# Patient Record
Sex: MALE | Race: ASIAN | NOT HISPANIC OR LATINO | Employment: UNEMPLOYED | URBAN - METROPOLITAN AREA
[De-identification: names, ages, dates, MRNs, and addresses within clinical notes are randomized per-mention and may not be internally consistent; named-entity substitution may affect disease eponyms.]

---

## 2017-09-23 ENCOUNTER — HOSPITAL ENCOUNTER (EMERGENCY)
Facility: HOSPITAL | Age: 77
Discharge: HOME | End: 2017-09-23
Attending: FAMILY MEDICINE

## 2017-09-23 VITALS
BODY MASS INDEX: 16.83 KG/M2 | RESPIRATION RATE: 16 BRPM | WEIGHT: 101 LBS | HEART RATE: 79 BPM | HEIGHT: 65 IN | OXYGEN SATURATION: 98 % | DIASTOLIC BLOOD PRESSURE: 69 MMHG | SYSTOLIC BLOOD PRESSURE: 129 MMHG | TEMPERATURE: 98 F

## 2017-09-23 DIAGNOSIS — R04.0 EPISTAXIS: Primary | ICD-10-CM

## 2017-09-23 LAB
ANION GAP SERPL CALC-SCNC: 7 MMOL/L
BASOPHILS # BLD AUTO: 0.09 K/UL
BASOPHILS NFR BLD: 1.5 %
BUN SERPL-MCNC: 27 MG/DL
CALCIUM SERPL-MCNC: 9.4 MG/DL
CHLORIDE SERPL-SCNC: 107 MMOL/L
CO2 SERPL-SCNC: 25 MMOL/L
CREAT SERPL-MCNC: 0.9 MG/DL
DIFFERENTIAL METHOD: ABNORMAL
EOSINOPHIL # BLD AUTO: 0.5 K/UL
EOSINOPHIL NFR BLD: 7.7 %
ERYTHROCYTE [DISTWIDTH] IN BLOOD BY AUTOMATED COUNT: 13.7 %
EST. GFR  (AFRICAN AMERICAN): >60 ML/MIN/1.73 M^2
EST. GFR  (NON AFRICAN AMERICAN): >60 ML/MIN/1.73 M^2
GLUCOSE SERPL-MCNC: 110 MG/DL
HCT VFR BLD AUTO: 38 %
HGB BLD-MCNC: 12.8 G/DL
LYMPHOCYTES # BLD AUTO: 2.2 K/UL
LYMPHOCYTES NFR BLD: 36.7 %
MCH RBC QN AUTO: 28.6 PG
MCHC RBC AUTO-ENTMCNC: 33.7 G/DL
MCV RBC AUTO: 85 FL
MONOCYTES # BLD AUTO: 0.5 K/UL
MONOCYTES NFR BLD: 7.9 %
NEUTROPHILS # BLD AUTO: 2.8 K/UL
NEUTROPHILS NFR BLD: 46 %
PLATELET # BLD AUTO: 248 K/UL
PMV BLD AUTO: 9.2 FL
POTASSIUM SERPL-SCNC: 4.5 MMOL/L
RBC # BLD AUTO: 4.47 M/UL
SODIUM SERPL-SCNC: 139 MMOL/L
WBC # BLD AUTO: 6.07 K/UL

## 2017-09-23 PROCEDURE — 99284 EMERGENCY DEPT VISIT MOD MDM: CPT | Mod: ,,, | Performed by: PHYSICIAN ASSISTANT

## 2017-09-23 PROCEDURE — 30901 CONTROL OF NOSEBLEED: CPT | Mod: RT

## 2017-09-23 PROCEDURE — 80048 BASIC METABOLIC PNL TOTAL CA: CPT

## 2017-09-23 PROCEDURE — 85025 COMPLETE CBC W/AUTO DIFF WBC: CPT

## 2017-09-23 PROCEDURE — 99283 EMERGENCY DEPT VISIT LOW MDM: CPT | Mod: 25

## 2017-09-23 PROCEDURE — 25000003 PHARM REV CODE 250: Performed by: PHYSICIAN ASSISTANT

## 2017-09-23 RX ORDER — LORATADINE 10 MG/1
10 TABLET ORAL DAILY
COMMUNITY

## 2017-09-23 RX ORDER — SILVER NITRATE 38.21; 12.74 MG/1; MG/1
1 STICK TOPICAL
Status: COMPLETED | OUTPATIENT
Start: 2017-09-23 | End: 2017-09-23

## 2017-09-23 RX ADMIN — SILVER NITRATE 1 APPLICATOR: 38.21; 12.74 STICK TOPICAL at 07:09

## 2017-09-23 NOTE — ED PROVIDER NOTES
Encounter Date: 9/23/2017       History     Chief Complaint   Patient presents with    Epistaxis     Seen at [facility] for the same Tuesday.      76 yo M presents to the ED with epistaxis.  Pt's daughter provides hx. She is a pediatrician. Pt has had intermittent nosebleeds for the past couple of weeks. He was seen at [facility] ER 4 days ago where his nosebleed required cauterization with silver nitrate. Daughter reports that this had resolved the bleeding until today. She reports R-sided unilateral epistaxis. She has attempted treatment for the patient with Afrin soaked gauze. Pt reports some           Review of patient's allergies indicates:  No Known Allergies  History reviewed. No pertinent past medical history.  History reviewed. No pertinent surgical history.  Family History   Problem Relation Age of Onset    No Known Problems Mother     No Known Problems Father      Social History   Substance Use Topics    Smoking status: Never Smoker    Smokeless tobacco: Never Used    Alcohol use No     Review of Systems   Constitutional: Negative for fever.   HENT: Positive for nosebleeds. Negative for sore throat.    Respiratory: Negative for shortness of breath.    Cardiovascular: Negative for chest pain.   Gastrointestinal: Negative for nausea.   Genitourinary: Negative for dysuria.   Musculoskeletal: Negative for back pain.   Skin: Negative for rash.   Neurological: Positive for light-headedness. Negative for weakness.   Hematological: Does not bruise/bleed easily.       Physical Exam     Initial Vitals [09/23/17 1731]   BP Pulse Resp Temp SpO2   129/69 79 16 97.8 °F (36.6 °C) 98 %      MAP       89         Physical Exam    Nursing note and vitals reviewed.  Constitutional: He appears well-developed and well-nourished.  Non-toxic appearance. He does not appear ill. No distress.   HENT:   Head: Normocephalic and atraumatic.   No active epistaxis after removal of gauze. Cauterized nasal mucosa noted.    Neck:  Normal range of motion. Neck supple.   Cardiovascular: Normal rate and regular rhythm. Exam reveals no gallop, no distant heart sounds and no friction rub.    No murmur heard.  Pulmonary/Chest: Effort normal and breath sounds normal. No accessory muscle usage. No tachypnea. No respiratory distress. He has no decreased breath sounds. He has no wheezes. He has no rhonchi. He has no rales.   Abdominal: Normal appearance. He exhibits no distension.   Musculoskeletal: Normal range of motion.   Neurological: He is alert and oriented to person, place, and time.   Skin: Skin is warm and dry. No rash noted. No pallor.   Psychiatric: He has a normal mood and affect. His behavior is normal. Judgment and thought content normal.         ED Course   Procedures  Labs Reviewed   CBC W/ AUTO DIFFERENTIAL - Abnormal; Notable for the following:        Result Value    RBC 4.47 (*)     Hemoglobin 12.8 (*)     Hematocrit 38.0 (*)     All other components within normal limits   BASIC METABOLIC PANEL - Abnormal; Notable for the following:     BUN, Bld 27 (*)     Anion Gap 7 (*)     All other components within normal limits             Medical Decision Making:   History:   Old Medical Records: I decided to obtain old medical records.  Differential Diagnosis:   My differential diagnosis includes but is not limited to: Anterior epistaxis, posterior epistaxis, anemia  Clinical Tests:   Lab Tests: Ordered and Reviewed       APC / Resident Notes:   76 yo M presents with epistaxis. Vitals are WNL. NAD. Gauze was removed and Afrin applied.  Patient was monitored for over 1 hour without any bleeding. A small area was cauterized.     Lab work unremarkable.  I will d/c in stable condition with ENT follow-up if bleeding recurs.  Return precautions given.  I have reviewed the patient's records and discussed this case with my supervising physician.                ED Course      Clinical Impression:   The encounter diagnosis was  Epistaxis.    Disposition:   Disposition: Discharged  Condition: Stable                          Taryn Gamble PA-C  09/23/17 5940

## 2017-09-23 NOTE — ED TRIAGE NOTES
Nose bleed right nare that started today at 1700pm.  Presents to ER with Afrin soaked packing.  The patient has been experiencing nose bleeding off and on for 2 weeks.    GENERAL: The patient is a frail elderly male in no apparent distress. Alert and oriented x4.                                                HEENT: Head is normocephalic and atraumatic. Extraocular muscles are intact. Pupils are equal, round, and reactive to light and accommodation. Nares appeared normal. Mouth is well hydrated and without lesions. Mucous membranes are moist. Posterior pharynx clear of any exudate or lesions.    NECK: Supple. No carotid bruits. No lymphadenopathy or thyromegaly.    LUNGS: Clear to auscultation.    HEART: Regular rate and rhythm without murmur.     ABDOMEN: Soft, nontender, and nondistended. Positive bowel sounds. No hepatosplenomegaly was noted.     EXTREMITIES: Without any cyanosis, clubbing, rash, lesions or edema.     NEUROLOGIC: Cranial nerves II through XII are grossly intact.     PSYCHIATRIC: Flat affect, but denies suicidal or homicidal ideations.    SKIN: No ulceration or induration present.

## 2017-09-26 ENCOUNTER — TELEPHONE (OUTPATIENT)
Dept: OTOLARYNGOLOGY | Facility: CLINIC | Age: 77
End: 2017-09-26

## 2017-09-26 NOTE — TELEPHONE ENCOUNTER
----- Message from Viridiana Puckett sent at 9/25/2017 10:08 AM CDT -----  Contact: Dr. Pruitt at [phone]  Carrie/Ambrose-Pt above was seen in the ER last night for a nosebleed.  Continues to bleed.  Dr. Pruitt, pts relative wants him seen in ENT here today.  Pt has no insurance and is a New Patient. Dr. Pruitt is aware of the $500.00 deposit.  Please call at above number.  Tried to reach everyone.

## 2017-12-06 ENCOUNTER — OFFICE VISIT (OUTPATIENT)
Dept: INTERNAL MEDICINE | Facility: CLINIC | Age: 77
End: 2017-12-06

## 2017-12-06 ENCOUNTER — IMMUNIZATION (OUTPATIENT)
Dept: INTERNAL MEDICINE | Facility: CLINIC | Age: 77
End: 2017-12-06

## 2017-12-06 VITALS
HEART RATE: 65 BPM | WEIGHT: 102.5 LBS | SYSTOLIC BLOOD PRESSURE: 120 MMHG | DIASTOLIC BLOOD PRESSURE: 63 MMHG | HEIGHT: 64 IN | BODY MASS INDEX: 17.5 KG/M2

## 2017-12-06 DIAGNOSIS — H40.9 GLAUCOMA, UNSPECIFIED GLAUCOMA TYPE, UNSPECIFIED LATERALITY: ICD-10-CM

## 2017-12-06 DIAGNOSIS — R04.0 EPISTAXIS: ICD-10-CM

## 2017-12-06 DIAGNOSIS — Z00.00 ANNUAL PHYSICAL EXAM: Primary | ICD-10-CM

## 2017-12-06 PROCEDURE — 90662 IIV NO PRSV INCREASED AG IM: CPT | Mod: PBBFAC

## 2017-12-06 PROCEDURE — 99204 OFFICE O/P NEW MOD 45 MIN: CPT | Mod: S$PBB,,, | Performed by: INTERNAL MEDICINE

## 2017-12-06 PROCEDURE — 99213 OFFICE O/P EST LOW 20 MIN: CPT | Mod: PBBFAC,25 | Performed by: INTERNAL MEDICINE

## 2017-12-06 PROCEDURE — 90732 PPSV23 VACC 2 YRS+ SUBQ/IM: CPT | Mod: PBBFAC

## 2017-12-06 PROCEDURE — 99999 PR PBB SHADOW E&M-EST. PATIENT-LVL III: CPT | Mod: PBBFAC,,, | Performed by: INTERNAL MEDICINE

## 2017-12-07 NOTE — PROGRESS NOTES
Subjective:       Patient ID: Dieudonne Lopez is a 77 y.o. male.    Chief Complaint: Establish Care    HPI     Mr. Lopez is a 78 yo male with history of epistaxis and chronic allergic cough who presents to clinic to establish care. He has recently moved from [place]. He is accompanied by his wife and daughter who is a pediatrician. He is feeling well today with no complaints.     He has had recent nose bleeds this year and was followed by ENT in [place]. Last nose bleed was here in September. He also had chronic cough which is due to allergies and post nasal drip. He is not taking anti-histamines because of the nosebleeds. His daughter states he had a CT of his chest after CXR revealed concerning lesion earlier this year in [place]. CT revealed benign appears granulomas and no further testing was done.     Cholesterol: (q5yr>21yo)  Vaccines: Influenza (yearly); Tetanus (every 10 yrs - 1st tdap); Pneumovax (>66yo); Prevnar; Zoster (>61yo)  Eye exam: History of glaucoma. Refer to ophthal today   Colonoscopy: Last C-scope at facility in [place] revealed on polyp that was benign.   A1c: check today       No past medical history on file.  No past surgical history on file.  Social History     Social History    Marital status:      Spouse name: N/A    Number of children: N/A    Years of education: N/A     Occupational History    Not on file.     Social History Main Topics    Smoking status: Never Smoker    Smokeless tobacco: Never Used    Alcohol use No    Drug use: No    Sexual activity: No     Other Topics Concern    Not on file     Social History Narrative    No narrative on file     Review of patient's allergies indicates:  No Known Allergies  Mr. Lopez had no medications administered during this visit.    Review of Systems   Constitutional: Negative for chills, diaphoresis, fatigue and fever.   HENT: Positive for congestion, nosebleeds, postnasal drip and rhinorrhea.    Eyes: Negative for  photophobia and visual disturbance.   Respiratory: Positive for cough. Negative for shortness of breath, wheezing and stridor.    Cardiovascular: Negative for chest pain, palpitations and leg swelling.   Gastrointestinal: Negative for abdominal pain, diarrhea, nausea and vomiting.   Endocrine: Negative for polydipsia and polyuria.   Genitourinary: Negative for dysuria, hematuria and urgency.   Musculoskeletal: Negative for arthralgias, gait problem and myalgias.   Skin: Negative for pallor and rash.   Allergic/Immunologic: Negative for immunocompromised state.   Neurological: Negative for dizziness, facial asymmetry, weakness, light-headedness and headaches.   Hematological: Negative for adenopathy.   Psychiatric/Behavioral: Negative for agitation and behavioral problems.       Objective:      Physical Exam   Constitutional: He is oriented to person, place, and time. He appears well-developed and well-nourished.   HENT:   Head: Normocephalic and atraumatic.   Eyes: EOM are normal. Pupils are equal, round, and reactive to light.   Neck: Normal range of motion. No JVD present.   Cardiovascular: Normal rate and regular rhythm.    Murmur heard.  Pulmonary/Chest: Effort normal and breath sounds normal. No respiratory distress.   Abdominal: Soft. Bowel sounds are normal. He exhibits no distension.   Musculoskeletal: Normal range of motion. He exhibits no edema.   Neurological: He is alert and oriented to person, place, and time. No cranial nerve deficit. Coordination normal.   Skin: Skin is warm and dry.   Psychiatric: He has a normal mood and affect. His behavior is normal.       Assessment:       1. Annual physical exam    2. Epistaxis    3. Glaucoma, unspecified glaucoma type, unspecified laterality        Plan:       1. Will order basic labs today. Will give flu shot and pneumovax today.   2. Refer to ENT.   3. Refer to ophthalmology.     Discussed with Dr. Haider.     Princess Perez MD PGY-3

## 2018-01-09 ENCOUNTER — OFFICE VISIT (OUTPATIENT)
Dept: OTOLARYNGOLOGY | Facility: CLINIC | Age: 78
End: 2018-01-09

## 2018-01-09 VITALS
DIASTOLIC BLOOD PRESSURE: 65 MMHG | SYSTOLIC BLOOD PRESSURE: 124 MMHG | HEART RATE: 66 BPM | BODY MASS INDEX: 18.05 KG/M2 | WEIGHT: 105.19 LBS

## 2018-01-09 DIAGNOSIS — R04.0 EPISTAXIS: Primary | ICD-10-CM

## 2018-01-09 PROCEDURE — 99999 PR PBB SHADOW E&M-EST. PATIENT-LVL II: CPT | Mod: PBBFAC,,, | Performed by: OTOLARYNGOLOGY

## 2018-01-09 PROCEDURE — 99212 OFFICE O/P EST SF 10 MIN: CPT | Mod: PBBFAC | Performed by: OTOLARYNGOLOGY

## 2018-01-09 PROCEDURE — 99203 OFFICE O/P NEW LOW 30 MIN: CPT | Mod: S$PBB,,, | Performed by: OTOLARYNGOLOGY

## 2018-01-09 NOTE — LETTER
January 9, 2018      [facility]  [address]  [facility] [address]           [facility]  [address]  [facility] [address]  Phone: [phone]  Fax: [phone]          Patient: Dieudonne Lopez   MR Number: [id number]   YOB: 1940   Date of Visit: 1/9/2018       Dear Dr. Princess Perez:    Thank you for referring Dieudonne Lopez to me for evaluation. Attached you will find relevant portions of my assessment and plan of care.    If you have questions, please do not hesitate to call me. I look forward to following Dieudonne Lopez along with you.    Sincerely,    Mesfin Kahn MD    Enclosure  CC:  No Recipients    If you would like to receive this communication electronically, please contact [email or web address] or [phone] to request more information on [employer] Link access.    For providers and/or their staff who would like to refer a patient to [facility], please contact us through our one-stop-shop provider referral line, [facility], at [phone].    If you feel you have received this communication in error or would no longer like to receive these types of communications, please e-mail [email or web address]

## 2018-01-09 NOTE — PROGRESS NOTES
Chief Complaint   Patient presents with    Epistaxis       HPI   77 y.o. male presents with an extensive history of epistaxis.  In the past, he required nasal cautery.  No recent nosebleeds.  Last episode was in 9/17.  No history of bleeding disorders or use of anticoagulation.     Review of Systems   Constitutional: Negative for fatigue and unexpected weight change.   HENT: Per HPI.  Eyes: Negative for visual disturbance.   Respiratory: Negative for shortness of breath, hemoptysis   Cardiovascular: Negative for chest pain and palpitations.   Musculoskeletal: Negative for decreased ROM, back pain.   Skin: Negative for rash, sunburn, itching.   Neurological: Negative for dizziness and seizures.   Hematological: Negative for adenopathy. Does not bruise/bleed easily.   Endocrine: Negative for rapid weight loss/weight gain, heat/cold intolerance.     Past Medical History   There is no problem list on file for this patient.          Past Surgical History   No past surgical history on file.      Family History   Family History   Problem Relation Age of Onset    No Known Problems Mother     No Known Problems Father            Social History   .  Social History     Social History    Marital status:      Spouse name: N/A    Number of children: N/A    Years of education: N/A     Occupational History    Not on file.     Social History Main Topics    Smoking status: Never Smoker    Smokeless tobacco: Never Used    Alcohol use No    Drug use: No    Sexual activity: No     Other Topics Concern    Not on file     Social History Narrative    No narrative on file         Allergies   Review of patient's allergies indicates:  No Known Allergies        Physical Exam     Vitals:    01/09/18 1622   BP: 124/65   Pulse: 66         Body mass index is 18.05 kg/m².      General: AOx3, NAD   Respiratory:  Symmetric chest rise, normal effort  Right Ear: External Auditory Canal WNL,TM w/o masses/lesions/perforations.  Middle  ear without evidence of effusion.   Left Ear: External Auditory Canal WNL,TM w/o masses/lesions/perforations.  Middle ear without evidence of effusion.   Nose: L nasal septal deviation. Inferior Turbinates WNL bilaterally. No septal perforation. No masses/lesions.   Oral Cavity:  Oral Tongue mobile, no lesions noted. Hard Palate WNL. No buccal or FOM lesions.  Oropharynx:  No masses/lesions of the posterior pharyngeal wall. Tonsillar fossa without lesions. Soft palate without masses. Midline uvula.   Neck: No scars.  No cervical lymphadenopathy, thyromegaly or thyroid nodules.  Normal range of motion.    Face: House Brackmann I bilaterally.     Assessment/Plan  Problem List Items Addressed This Visit     None      Visit Diagnoses     Epistaxis    -  Primary    Well-controlled.  Moisturize nose with saline spray.  RTC PRN.

## 2018-01-23 ENCOUNTER — OFFICE VISIT (OUTPATIENT)
Dept: OPTOMETRY | Facility: CLINIC | Age: 78
End: 2018-01-23

## 2018-01-23 DIAGNOSIS — H52.4 MYOPIA WITH PRESBYOPIA OF BOTH EYES: ICD-10-CM

## 2018-01-23 DIAGNOSIS — H04.129 DRY EYE SYNDROME: ICD-10-CM

## 2018-01-23 DIAGNOSIS — H25.13 NUCLEAR SCLEROSIS OF BOTH EYES: Primary | ICD-10-CM

## 2018-01-23 DIAGNOSIS — H52.13 MYOPIA WITH PRESBYOPIA OF BOTH EYES: ICD-10-CM

## 2018-01-23 PROCEDURE — 99212 OFFICE O/P EST SF 10 MIN: CPT | Mod: PBBFAC | Performed by: OPTOMETRIST

## 2018-01-23 PROCEDURE — 99999 PR PBB SHADOW E&M-EST. PATIENT-LVL II: CPT | Mod: PBBFAC,,, | Performed by: OPTOMETRIST

## 2018-01-23 PROCEDURE — 92004 COMPRE OPH EXAM NEW PT 1/>: CPT | Mod: S$PBB,,, | Performed by: OPTOMETRIST

## 2018-01-23 RX ORDER — BENZONATATE 100 MG/1
100 CAPSULE ORAL 3 TIMES DAILY PRN
COMMUNITY

## 2018-01-23 NOTE — PROGRESS NOTES
HPI     Last eye exam was approximately 1 year ago.  Patient states no vision changes since last exam but sometimes vision gets   blurry.  Patient denies diplopia, headaches, flashes/floaters, and pain.      Last edited by Tiffanie Yuan on 1/23/2018  3:35 PM. (History)            Assessment /Plan     For exam results, see Encounter Report.    Nuclear sclerosis of both eyes    Dry eye syndrome    Myopia with presbyopia of both eyes            1.  Educated on cataracts and affects on vision.  Patient happy with vision.  Monitor.  2.  Recommend artificial tears 2-3x/day OU.    3.  Continue w/ current rx                RTC 1 year for routine exam.

## 2018-01-23 NOTE — LETTER
January 23, 2018      [facility]  [address]  [facility] [address]           [facility]  [address]  [facility] [address]  Phone: [phone]  Fax: [phone]          Patient: Dieudonne Lopez   MR Number: [id number]   YOB: 1940   Date of Visit: 1/23/2018       Dear Dr. Princess Perez:    Thank you for referring Dieudonne Lopez to me for evaluation. Attached you will find relevant portions of my assessment and plan of care.    If you have questions, please do not hesitate to call me. I look forward to following Dieudonne Lopez along with you.    Sincerely,    Jodie Schuster, OD    Enclosure  CC:  No Recipients    If you would like to receive this communication electronically, please contact [email or web address] or [phone] to request more information on [employer] Link access.    For providers and/or their staff who would like to refer a patient to [facility], please contact us through our one-stop-shop provider referral line, [facility], at [phone].    If you feel you have received this communication in error or would no longer like to receive these types of communications, please e-mail [email or web address]

## 2018-11-13 ENCOUNTER — OFFICE VISIT (OUTPATIENT)
Dept: INTERNAL MEDICINE | Facility: CLINIC | Age: 78
End: 2018-11-13

## 2018-11-13 ENCOUNTER — IMMUNIZATION (OUTPATIENT)
Dept: INTERNAL MEDICINE | Facility: CLINIC | Age: 78
End: 2018-11-13

## 2018-11-13 VITALS
OXYGEN SATURATION: 98 % | HEIGHT: 65 IN | SYSTOLIC BLOOD PRESSURE: 118 MMHG | BODY MASS INDEX: 16.42 KG/M2 | HEART RATE: 55 BPM | DIASTOLIC BLOOD PRESSURE: 70 MMHG | WEIGHT: 98.56 LBS

## 2018-11-13 DIAGNOSIS — Z00.00 ANNUAL PHYSICAL EXAM: Primary | ICD-10-CM

## 2018-11-13 DIAGNOSIS — M25.511 RIGHT SHOULDER PAIN, UNSPECIFIED CHRONICITY: ICD-10-CM

## 2018-11-13 PROCEDURE — 99397 PER PM REEVAL EST PAT 65+ YR: CPT | Mod: S$PBB,,, | Performed by: PHYSICIAN ASSISTANT

## 2018-11-13 PROCEDURE — 99999 PR PBB SHADOW E&M-EST. PATIENT-LVL III: CPT | Mod: PBBFAC,,, | Performed by: PHYSICIAN ASSISTANT

## 2018-11-13 PROCEDURE — 90662 IIV NO PRSV INCREASED AG IM: CPT | Mod: PBBFAC

## 2018-11-13 PROCEDURE — 99213 OFFICE O/P EST LOW 20 MIN: CPT | Mod: PBBFAC,25 | Performed by: PHYSICIAN ASSISTANT

## 2018-11-13 NOTE — PATIENT INSTRUCTIONS
Understanding Glenohumeral Osteoarthritis    A joint is a place where two bones meet. The glenohumeral joint is the main joint in the shoulder. It is a ball-and-socket joint. It is formed where the head or ball of the upper arm bone fits into the shallow socket on the shoulder blade. The upper arm bone is called the humerus. The socket is called the glenoid. This is how the joint gets its name. When the glenohumeral joint is healthy, it helps you rotate and move your arm freely without any pain.  With glenohumeral osteoarthritis, the parts of the joint wear down. This can cause pain, stiffness, and limited movement. Glenohumeral osteoarthritis is a long-term condition. But treatment can help manage symptoms, increase movement, and improve function.  How glenohumeral osteoarthritis occurs  All joints contain a smooth tissue called cartilage. Cartilage cushions the ends of bones. This helps them glide smoothly against each other. Glenohumeral osteoarthritis occurs when cartilage in the glenohumeral joint begins to break down. The ball and socket bones may then become exposed and rub together. The bones may become rough and pitted and may begin to wear away. This prevents smooth movement of the joint.  Causes of glenohumeral osteoarthritis  In most cases, the condition develops because of damage from a shoulder injury, such as a dislocated or broken shoulder. Normal wear and tear of the joint from aging can also cause osteoarthritis.  Symptoms of glenohumeral osteoarthritis  Symptoms tend to develop slowly over months to years. Shoulder pain is common. The pain is often worse with activity, and may get better with rest. Over time, the pain may worsen, and may even occur at night.  Stiffness in the shoulder is also common. It may be hard to move or use the arm and shoulder as you normally would. This can make even simple tasks difficult, such as reaching for an item on a shelf or getting dressed.  Treating  glenohumeral osteoarthritis  Treatment may include:  · Resting the shoulder. This involves limiting certain movements, such as reaching, lifting, pushing, or pulling. These can cause further wear and tear of the joint and make symptoms worse.  · Cold or heat packs. Cold packs can help reduce pain and swelling. Heat packs do not help with swelling. But they may help relieve pain and stiffness, especially before physical therapy or activity.  · Pain medicines. These help relieve pain and swelling. Medicines may be prescribed or bought over the counter.  · Injections of medicine into the joint. These help relieve symptoms for a time.  · Physical therapy and exercises.  These help improve strength and range of motion in the joint. This may reduce shoulder stiffness and improve function.  · Certain assistive devices. These are tools that can be used to make activities of daily life easier. For instance, a grasper can help you reach and grab items.  · Alternative treatments. These include options, such as acupuncture or massage. They may help relieve pain and stiffness in some cases.  If other treatments dont do enough to relieve symptoms, you may need surgery. During surgery, the damaged joint is removed. It is then replaced with an artificial joint. This can help relieve symptoms and improve function to near normal.     When to call your healthcare provider  Call your healthcare provider right away if you have any of these:  · Fever of 100.4°F (38°C) or higher, or as directed  · Symptoms that dont get better with treatment, or get worse  · New symptoms   Date Last Reviewed: 3/10/2016  © 0293-5671 [employer]. [address] [place] [address], [place] [address]. All rights reserved. This information is not intended as a substitute for professional medical care. Always follow your healthcare professional's instructions.

## 2018-11-13 NOTE — PROGRESS NOTES
"Subjective:       Patient ID: Dieudonne Lopez is a 78 y.o. male.    Chief Complaint: Establish Care    HPI     Pt presents to clinic with daughter for annual exam. Pt was [facility]    Today he states he is doing well. He notes some mild right shoulder pain, aching, 4/10. No recent injury. He note tried any relieving factor. Pain worse with certain movements.     He takes no prescription medications. Good appetite, weight stable. BP WNL.     Obtained Psv23 in 2017 at outside hosptial  Here obtained Prevnar 13 12/2017  Next PSVS3 due 2022  FLU due     Past Medical History:   Diagnosis Date    Cataract     Epistaxis      Review of patient's allergies indicates:  No Known Allergies    Meds: None    Review of Systems   Constitutional: Negative for chills, fever and unexpected weight change.   Eyes: Negative for visual disturbance.   Respiratory: Negative for cough, shortness of breath and wheezing.    Cardiovascular: Negative for chest pain and leg swelling.   Gastrointestinal: Negative for abdominal pain, nausea and vomiting.   Endocrine: Negative for polydipsia, polyphagia and polyuria.   Skin: Negative for rash.   Neurological: Negative for weakness, light-headedness and headaches.           Objective: /70   Pulse (!) 55   Ht 5' 5" (1.651 m)   Wt 44.7 kg (98 lb 8.7 oz)   SpO2 98%   BMI 16.40 kg/m²         Physical Exam   Constitutional: He is oriented to person, place, and time. He appears well-developed and well-nourished. No distress.   HENT:   Head: Normocephalic and atraumatic.   Mouth/Throat: Oropharynx is clear and moist.   Eyes: Pupils are equal, round, and reactive to light.   Cardiovascular: Normal rate and regular rhythm. Exam reveals no friction rub.   No murmur heard.  Pulmonary/Chest: Effort normal and breath sounds normal. He has no wheezes. He has no rales.   Abdominal: Soft. Bowel sounds are normal. There is no tenderness.   Musculoskeletal: Normal range of motion. "        Right shoulder: He exhibits deformity and pain (with forward flexion). He exhibits normal range of motion, no swelling and normal strength.   Negative impingment   Neurological: He is alert and oriented to person, place, and time.   Skin: Skin is warm and dry. Capillary refill takes less than 2 seconds. No rash noted.   Psychiatric: He has a normal mood and affect.   Vitals reviewed.      Assessment:       1. Annual physical exam    2. Right shoulder pain, unspecified chronicity        Plan:             Dieudonne was seen today for establish care.    Diagnoses and all orders for this visit:    Annual physical exam  -     CBC auto differential; Future  -     Comprehensive metabolic panel; Future  -     TSH; Future    Right shoulder pain, unspecified chronicity  Trial of tylenol prn  RTC if symptoms worsen or fail to improve.     FLU today  Pt to elect PCP (daughter plans to help set up)      Nieves Castrejon PA-C

## 2018-11-15 ENCOUNTER — TELEPHONE (OUTPATIENT)
Dept: INTERNAL MEDICINE | Facility: CLINIC | Age: 78
End: 2018-11-15

## 2019-10-15 ENCOUNTER — OFFICE VISIT (OUTPATIENT)
Dept: INTERNAL MEDICINE | Facility: CLINIC | Age: 79
End: 2019-10-15

## 2019-10-15 ENCOUNTER — CLINICAL SUPPORT (OUTPATIENT)
Dept: INTERNAL MEDICINE | Facility: CLINIC | Age: 79
End: 2019-10-15

## 2019-10-15 VITALS
DIASTOLIC BLOOD PRESSURE: 60 MMHG | OXYGEN SATURATION: 98 % | SYSTOLIC BLOOD PRESSURE: 100 MMHG | HEIGHT: 65 IN | WEIGHT: 102.5 LBS | BODY MASS INDEX: 17.08 KG/M2 | HEART RATE: 68 BPM

## 2019-10-15 DIAGNOSIS — Z00.00 HEALTHCARE MAINTENANCE: Primary | ICD-10-CM

## 2019-10-15 PROCEDURE — 90471 IMMUNIZATION ADMIN: CPT | Mod: PBBFAC

## 2019-10-15 PROCEDURE — 99213 OFFICE O/P EST LOW 20 MIN: CPT | Mod: PBBFAC,25 | Performed by: STUDENT IN AN ORGANIZED HEALTH CARE EDUCATION/TRAINING PROGRAM

## 2019-10-15 PROCEDURE — 99999 PR PBB SHADOW E&M-EST. PATIENT-LVL I: ICD-10-PCS | Mod: PBBFAC,,,

## 2019-10-15 PROCEDURE — 99211 OFF/OP EST MAY X REQ PHY/QHP: CPT | Mod: PBBFAC,27,25

## 2019-10-15 PROCEDURE — 99397 PR PREVENTIVE VISIT,EST,65 & OVER: ICD-10-PCS | Mod: S$PBB,,, | Performed by: STUDENT IN AN ORGANIZED HEALTH CARE EDUCATION/TRAINING PROGRAM

## 2019-10-15 PROCEDURE — 99397 PER PM REEVAL EST PAT 65+ YR: CPT | Mod: S$PBB,,, | Performed by: STUDENT IN AN ORGANIZED HEALTH CARE EDUCATION/TRAINING PROGRAM

## 2019-10-15 PROCEDURE — 99999 PR PBB SHADOW E&M-EST. PATIENT-LVL III: ICD-10-PCS | Mod: PBBFAC,,, | Performed by: STUDENT IN AN ORGANIZED HEALTH CARE EDUCATION/TRAINING PROGRAM

## 2019-10-15 PROCEDURE — 99999 PR PBB SHADOW E&M-EST. PATIENT-LVL I: CPT | Mod: PBBFAC,,,

## 2019-10-15 PROCEDURE — 99999 PR PBB SHADOW E&M-EST. PATIENT-LVL III: CPT | Mod: PBBFAC,,, | Performed by: STUDENT IN AN ORGANIZED HEALTH CARE EDUCATION/TRAINING PROGRAM

## 2019-10-15 NOTE — PROGRESS NOTES
"Subjective:       Patient ID: Dieudonne Lopez is a 79 y.o. male.    Chief Complaint: Annual Exam    HPI   Mr. Lopez presents to resident clinic today for annual with his daughter who is a pediatrician - cards fellow presently.   He has no complaints today.   He has been a relatively thin man and endorses good appetite.   Weight is improved some pounds since last visit.   However, he does struggle with constipation that responds to OTC fiber supplementation and sometimes requires OTC mag ox.  He reports no blood w/ BM, abdominal pain, N/V, fever, chills.     PMH of epistaxsis and chronic allergic cough with post-nasal drip.  Was in hospital several years ago for rib # s/p fall otherwise no other hospitalizations and has stayed off of prescription medication.     Extensive history of epistaxis. In the past, he required nasal cautery. No history of bleeding disorders or use of anticoagulation.  He was evaluated by [facility] 1/9/18. Per the visit, he was to moisturize nose with saline spray and RTC prn.   He is not taking anti-histamines because of the nosebleeds.  He notes continuing to keep his nose well moisturized.    Per chart review, "his daughter states he had a CT of his chest after CXR revealed concerning lesion earlier this year in [place]. CT revealed benign appears granulomas and no further testing was done."     Obtained Prevnar 13 12/2017.  Denies smoking history, cough, SOB, wheezing.      Eye exam: History of glaucoma. Following w/ ortho.     Colonoscopy: Last C-scope at facility in [place] in Dec 2015 revealed on polyp that was benign. Was recommended follow-up in 3 years.       Review of Systems   Constitutional: Negative for activity change, appetite change, chills, diaphoresis, fatigue, fever and unexpected weight change.   HENT: Positive for postnasal drip. Negative for congestion, rhinorrhea, sneezing, sore throat and trouble swallowing.    Eyes: Negative for visual disturbance.   Respiratory: " "Negative for cough, shortness of breath and wheezing.    Cardiovascular: Negative for chest pain, palpitations and leg swelling.   Gastrointestinal: Negative for abdominal pain, blood in stool, constipation, diarrhea, nausea and vomiting.   Genitourinary: Negative for dysuria and hematuria.   Musculoskeletal: Negative for arthralgias and myalgias.   Skin: Negative for rash.   Neurological: Negative for dizziness, light-headedness and headaches.       Objective:         Vitals:    10/15/19 0925   BP: 100/60   BP Location: Right arm   Patient Position: Sitting   BP Method: Small (Manual)   Pulse: 68   SpO2: 98%   Weight: 46.5 kg (102 lb 8.2 oz)   Height: 5' 5" (1.651 m)         Physical Exam   Constitutional: He is oriented to person, place, and time. No distress.   HENT:   Head: Normocephalic and atraumatic.   Right Ear: External ear normal.   Left Ear: External ear normal.   Nose: Nose normal.   Mouth/Throat: Oropharynx is clear and moist. No oropharyngeal exudate.   Eyes: Conjunctivae are normal. No scleral icterus.   Neck: Normal range of motion. Neck supple. No thyromegaly present.   Cardiovascular: Normal rate, regular rhythm and intact distal pulses. Exam reveals no gallop and no friction rub.   No murmur heard.  Pulmonary/Chest: Effort normal and breath sounds normal. He has no wheezes. He has no rales.   Abdominal: Soft. Bowel sounds are normal. There is no tenderness. There is no guarding.   Musculoskeletal: Normal range of motion. He exhibits no edema.   Lymphadenopathy:     He has no cervical adenopathy.   Neurological: He is alert and oriented to person, place, and time.   Skin: Skin is warm and dry. He is not diaphoretic.   Psychiatric: He has a normal mood and affect.   Nursing note and vitals reviewed.      Assessment:       1. Healthcare maintenance        Plan:       Dieudonne was seen today for annual exam.    Diagnoses and all orders for this visit:    Healthcare maintenance  -     Lipid panel; " Future  -     Hemoglobin A1c; Future  -     Case request GI: COLONOSCOPY    80yo M who presents for annual. Thin, however, weight stable and overall healthy and sprightly w/o concerning constitutional symptoms.   Schedule c-scope today; due per last c-scope recommendations per HPI.  Flu, shingles, and PPSV23 today.     Return to clinic annually and prn.   Prefer not to establish with resident.    Discussed with Dr. Mcmanus.

## 2019-10-15 NOTE — PATIENT INSTRUCTIONS
Colonoscopy     A camera attached to a flexible tube with a viewing lens is used to take video pictures.     Colonoscopy is a test to view the inside of your lower digestive tract (colon and rectum). Sometimes it can show the last part of the small intestine (ileum). During the test, small pieces of tissue may be removed for testing. This is called a biopsy. Small growths, such as polyps, may also be removed.   Why is colonoscopy done?  The test is done to help look for colon cancer. And it can help find the source of abdominal pain, bleeding, and changes in bowel habits. It may be needed once a year, depending on factors such as your:  · Age  · Health history  · Family health history  · Symptoms  · Results from any prior colonoscopy  Risks and possible complications  These include:  · Bleeding               · A puncture or tear in the colon   · Risks of anesthesia  · A cancer lesion not being seen  Getting ready   To prepare for the test:  · Talk with your healthcare provider about the risks of the test (see below). Also ask your healthcare provider about alternatives to the test.  · Tell your healthcare provider about any medicines you take. Also tell him or her about any health conditions you may have.  · Make sure your rectum and colon are empty for the test. Follow the diet and bowel prep instructions exactly. If you dont, the test may need to be rescheduled.  · Plan for a friend or family member to drive you home after the test.     Colonoscopy provides an inside view of the entire colon.     You may discuss the results with your doctor right away or at a future visit.  During the test   The test is usually done in the hospital on an outpatient basis. This means you go home the same day. The procedure takes about 30 minutes. During that time:  · You are given relaxing (sedating) medicine through an IV line. You may be drowsy, or fully asleep.  · The healthcare provider will first give you a physical exam to  check for anal and rectal problems.  · Then the anus is lubricated and the scope inserted.  · If you are awake, you may have a feeling similar to needing to have a bowel movement. You may also feel pressure as air is pumped into the colon. Its OK to pass gas during the procedure.  · Biopsy, polyp removal, or other treatments may be done during the test.  After the test   You may have gas right after the test. It can help to try to pass it to help prevent later bloating. Your healthcare provider may discuss the results with you right away. Or you may need to schedule a follow-up visit to talk about the results. After the test, you can go back to your normal eating and other activities. You may be tired from the sedation and need to rest for a few hours.  Date Last Reviewed: 11/1/2016 © 2000-2017 [employer]. [address] [place] [address], [place] [address]. All rights reserved. This information is not intended as a substitute for professional medical care. Always follow your healthcare professional's instructions.

## 2019-10-17 NOTE — PROGRESS NOTES
"I have personally discussed  this patient with the resident and agree with the resident's note as stated above with the following thoughts:  /60 (BP Location: Right arm, Patient Position: Sitting, BP Method: Small (Manual))   Pulse 68   Ht 5' 5" (1.651 m)   Wt 46.5 kg (102 lb 8.2 oz)   SpO2 98%   BMI 17.06 kg/m²     Discussed getting vaccines up to date.  Discussed importance of low fat diet and exercise     "